# Patient Record
Sex: MALE | Race: WHITE | NOT HISPANIC OR LATINO | Employment: FULL TIME | URBAN - METROPOLITAN AREA
[De-identification: names, ages, dates, MRNs, and addresses within clinical notes are randomized per-mention and may not be internally consistent; named-entity substitution may affect disease eponyms.]

---

## 2017-06-13 ENCOUNTER — HOSPITAL ENCOUNTER (EMERGENCY)
Facility: HOSPITAL | Age: 32
Discharge: HOME | End: 2017-06-13
Attending: EMERGENCY MEDICINE | Admitting: EMERGENCY MEDICINE
Payer: COMMERCIAL

## 2017-06-13 ENCOUNTER — APPOINTMENT (EMERGENCY)
Dept: RADIOLOGY | Facility: HOSPITAL | Age: 32
End: 2017-06-13
Payer: COMMERCIAL

## 2017-06-13 VITALS
BODY MASS INDEX: 26.6 KG/M2 | OXYGEN SATURATION: 97 % | RESPIRATION RATE: 16 BRPM | DIASTOLIC BLOOD PRESSURE: 62 MMHG | HEIGHT: 71 IN | WEIGHT: 190 LBS | HEART RATE: 84 BPM | SYSTOLIC BLOOD PRESSURE: 134 MMHG | TEMPERATURE: 97.6 F

## 2017-06-13 DIAGNOSIS — S62.639A DISTAL PHALANX OR PHALANGES, CLOSED FRACTURE: Primary | ICD-10-CM

## 2017-06-13 PROCEDURE — 73140 X-RAY EXAM OF FINGER(S): CPT

## 2017-06-13 PROCEDURE — 99283 EMERGENCY DEPT VISIT LOW MDM: CPT

## 2017-06-13 RX ORDER — OXYCODONE HYDROCHLORIDE AND ACETAMINOPHEN 5; 325 MG/1; MG/1
1 TABLET ORAL EVERY 6 HOURS PRN
Qty: 12 TABLET | Refills: 0 | Status: SHIPPED | OUTPATIENT
Start: 2017-06-13 | End: 2017-09-12

## 2017-06-13 RX ORDER — OXYCODONE HYDROCHLORIDE AND ACETAMINOPHEN 5; 325 MG/1; MG/1
1 TABLET ORAL ONCE
Status: COMPLETED | OUTPATIENT
Start: 2017-06-13 | End: 2017-06-13

## 2017-06-13 RX ADMIN — OXYCODONE HYDROCHLORIDE AND ACETAMINOPHEN 1 TABLET: 5; 325 TABLET ORAL at 19:54

## 2017-09-12 ENCOUNTER — HOSPITAL ENCOUNTER (EMERGENCY)
Facility: HOSPITAL | Age: 32
Discharge: HOME | End: 2017-09-12
Attending: EMERGENCY MEDICINE | Admitting: EMERGENCY MEDICINE
Payer: COMMERCIAL

## 2017-09-12 VITALS
BODY MASS INDEX: 27.71 KG/M2 | WEIGHT: 204.59 LBS | TEMPERATURE: 97.7 F | SYSTOLIC BLOOD PRESSURE: 120 MMHG | RESPIRATION RATE: 18 BRPM | HEIGHT: 72 IN | HEART RATE: 71 BPM | DIASTOLIC BLOOD PRESSURE: 80 MMHG | OXYGEN SATURATION: 98 %

## 2017-09-12 DIAGNOSIS — K02.9 DENTAL CARIES: Primary | ICD-10-CM

## 2017-09-12 PROCEDURE — 99282 EMERGENCY DEPT VISIT SF MDM: CPT

## 2017-09-12 RX ORDER — PENICILLIN V POTASSIUM 250 MG/1
500 TABLET ORAL ONCE
Status: COMPLETED | OUTPATIENT
Start: 2017-09-12 | End: 2017-09-12

## 2017-09-12 RX ORDER — OXYCODONE HYDROCHLORIDE AND ACETAMINOPHEN 5; 325 MG/1; MG/1
1 TABLET ORAL EVERY 6 HOURS PRN
Qty: 6 TABLET | Refills: 0 | Status: SHIPPED | OUTPATIENT
Start: 2017-09-12 | End: 2017-09-15

## 2017-09-12 RX ORDER — OXYCODONE HYDROCHLORIDE AND ACETAMINOPHEN 5; 325 MG/1; MG/1
1 TABLET ORAL ONCE
Status: COMPLETED | OUTPATIENT
Start: 2017-09-12 | End: 2017-09-12

## 2017-09-12 RX ORDER — PENICILLIN V POTASSIUM 500 MG/1
500 TABLET ORAL 3 TIMES DAILY
Qty: 30 TABLET | Refills: 0 | Status: SHIPPED | OUTPATIENT
Start: 2017-09-12 | End: 2017-09-22

## 2017-09-12 RX ADMIN — OXYCODONE HYDROCHLORIDE AND ACETAMINOPHEN 1 TABLET: 5; 325 TABLET ORAL at 22:32

## 2017-09-12 RX ADMIN — PENICILLIN V POTASIUM 500 MG: 250 TABLET ORAL at 22:32

## 2017-09-13 NOTE — ED PROVIDER NOTES
History  Chief Complaint   Patient presents with    Dental Pain     ongoing dental issue with left upper molar  1 week ago started with pain progressively worsening     33 y o  male  with no significant past medical history presents to ED with chief complaint of dental pain  Onset reported as 1 week ago  Location of symptoms is reported as left upper molar  Quality is reported as throbbing pain  Severity is reported as severe  Associated symptoms: denies headaches, denies fevers, denies dysphagia, denies dysphonia, denies drooling, denies facial swelling, denies rash  Modifiers:  Chewing and eating exacerbates pain  Context: medical summary: review of past visit history via EPIC demonstrates patient was last seen in ED on 6/13/2017 for evaluation of right hand middle finger crush injury  History provided by:  Patient   used: No    Dental Pain   Associated symptoms: no congestion, no drooling, no facial swelling, no fever, no headaches, no neck pain and no oral lesions        None       History reviewed  No pertinent past medical history  History reviewed  No pertinent surgical history  History reviewed  No pertinent family history  I have reviewed and agree with the history as documented  Social History   Substance Use Topics    Smoking status: Current Every Day Smoker     Packs/day: 1 00    Smokeless tobacco: Former User    Alcohol use Yes      Comment: socially        Review of Systems   Constitutional: Negative for activity change, appetite change, chills, diaphoresis, fatigue and fever  HENT: Positive for dental problem  Negative for congestion, drooling, ear discharge, ear pain, facial swelling, hearing loss, mouth sores, nosebleeds, postnasal drip, rhinorrhea, sinus pain, sinus pressure, sneezing, sore throat, tinnitus, trouble swallowing and voice change  Eyes: Negative for photophobia, pain, discharge, redness, itching and visual disturbance  Respiratory: Negative for cough, chest tightness, shortness of breath and wheezing  Cardiovascular: Negative for chest pain, palpitations and leg swelling  Gastrointestinal: Negative for abdominal pain, constipation, diarrhea, nausea and vomiting  Endocrine: Negative for cold intolerance, heat intolerance, polydipsia, polyphagia and polyuria  Genitourinary: Negative for decreased urine volume, difficulty urinating, dysuria, flank pain, frequency, hematuria and urgency  Musculoskeletal: Negative for arthralgias, back pain, gait problem, joint swelling, myalgias, neck pain and neck stiffness  Skin: Negative for color change, pallor, rash and wound  Allergic/Immunologic: Negative for environmental allergies, food allergies and immunocompromised state  Neurological: Negative for dizziness, tremors, seizures, syncope, facial asymmetry, speech difficulty, weakness, light-headedness, numbness and headaches  Hematological: Negative for adenopathy  Does not bruise/bleed easily  Psychiatric/Behavioral: Negative for agitation, confusion, decreased concentration and hallucinations  The patient is not nervous/anxious  All other systems reviewed and are negative  Physical Exam  ED Triage Vitals [09/12/17 2147]   Temperature Pulse Respirations Blood Pressure SpO2   97 7 °F (36 5 °C) 71 18 120/80 98 %      Temp Source Heart Rate Source Patient Position - Orthostatic VS BP Location FiO2 (%)   Oral Monitor Sitting Right arm --      Pain Score       9           Physical Exam   Constitutional: He is oriented to person, place, and time  He appears well-developed and well-nourished  No distress  /80   Pulse 71   Temp 97 7 °F (36 5 °C) (Oral)   Resp 18   Ht 6' (1 829 m)   Wt 92 8 kg (204 lb 9 4 oz)   SpO2 98%   BMI 27 75 kg/m²   Interpretation normal, no intervention  HENT:   Head: Normocephalic and atraumatic     Right Ear: External ear normal    Left Ear: External ear normal    Nose: Nose normal    Mouth/Throat: Oropharynx is clear and moist  No oropharyngeal exudate  There is enamel erosion with gingival erythema present to left upper 1st molar  No definable dental abscess  No sublingual or submandibular fullness or swelling  No trismus  Eyes: Conjunctivae and EOM are normal  Pupils are equal, round, and reactive to light  Right eye exhibits no discharge  Left eye exhibits no discharge  No scleral icterus  Neck: Normal range of motion  Neck supple  No JVD present  No tracheal deviation present  Cardiovascular: Normal rate, regular rhythm and intact distal pulses  Pulmonary/Chest: Effort normal and breath sounds normal  No respiratory distress  He has no wheezes  He has no rales  He exhibits no tenderness  Abdominal: Soft  Bowel sounds are normal  He exhibits no distension and no mass  There is no tenderness  There is no guarding  Musculoskeletal: Normal range of motion  He exhibits no edema, tenderness or deformity  Lymphadenopathy:     He has no cervical adenopathy  Neurological: He is alert and oriented to person, place, and time  No cranial nerve deficit  He exhibits normal muscle tone  Coordination normal    Skin: Skin is warm  Capillary refill takes less than 2 seconds  No rash noted  He is not diaphoretic  No erythema  No pallor  Psychiatric: He has a normal mood and affect  His behavior is normal  Judgment and thought content normal    Nursing note and vitals reviewed        ED Medications  Medications   oxyCODONE-acetaminophen (PERCOCET) 5-325 mg per tablet 1 tablet (1 tablet Oral Given 9/12/17 2232)   penicillin V potassium (VEETID) tablet 500 mg (500 mg Oral Given 9/12/17 2232)       Diagnostic Studies  Labs Reviewed - No data to display    No orders to display       Procedures  Procedures      Phone Contacts  ED Phone Contact    ED Course  ED Course                                MDM  Number of Diagnoses or Management Options  Dental caries: new and does not require workup  Diagnosis management comments: Differential diagnosis includes but is not limited to dental caries, dental infection, dental abscess, facial abscess, facial cellulitis, considered but doubt Panda's angina  Amount and/or Complexity of Data Reviewed  Review and summarize past medical records: yes    Risk of Complications, Morbidity, and/or Mortality  General comments: Discussed with patient treatment to include antibiotics to cover normal oral marie infections, add analgesic pain medication  Follow up with primary care physician and dentist in 1-2 days for further evaluation and treatment  Reviewed reasons to return to the emergency department  Standard narcotic precautions were given  Patient Progress  Patient progress: stable    CritCare Time    Disposition  Final diagnoses:   Dental caries     ED Disposition     ED Disposition Condition Comment    Discharge  Naima Challenger discharge to home/self care  Condition at discharge: Stable        Follow-up Information     Follow up With Specialties Details Why Kristi [address]  Call in 1 day for further evaluation of symptoms [phone], [place]  Call in 1 day for further evaluation of symptoms [address] [address] [facility]  [phone]          Discharge Medication List as of 9/12/2017 10:22 PM      START taking these medications    Details   oxyCODONE-acetaminophen (PERCOCET) 5-325 mg per tablet Take 1 tablet by mouth every 6 (six) hours as needed (dental pain/initial rx) for up to 3 days Label no driving no etoh  Initial rx  Dx: Max Daily Amount: 4 tablets, Starting Tue 9/12/2017, Until Fri 9/15/2017, Print      penicillin V potassium (VEETID) 500 mg tablet Take 1 tablet by mouth 3 (three) times a day for 10 days, Starting Tue 9/12/2017, Until Fri 9/22/2017, Print           No discharge procedures on file      ED Provider  Electronically Signed by       Porter Vegas Mike Ham PA-C  09/13/17 2412

## 2017-09-13 NOTE — DISCHARGE INSTRUCTIONS
Dental Caries   WHAT YOU NEED TO KNOW:   Dental caries are also called cavities  Cavities are caused by bacteria  When the bacteria in tooth plaque (sticky film) mix with certain types of carbohydrate, this creates acid  The acid breaks down areas of enamel, which covers the outside of a tooth  This creates a small hole in the tooth called a cavity  DISCHARGE INSTRUCTIONS:   Return to the emergency department if:   · Your face, jaw, cheek, eye, or neck begin to swell  Contact your dentist if:   · You have a fever  · Your tooth pain gets worse  · You have questions or concerns about your condition or care  Follow up with your dentist as directed:  Write down your questions so you remember to ask them during your visits  Prevent dental caries:   · Brush your teeth at least 2 times a day with fluoride toothpaste  · Use dental floss to clean between your teeth at least once a day  · Rinse your mouth with water or mouthwash after meals and snacks  · Chew sugarless gum after meals and snacks  · See your dentist regularly for dental cleanings and oral exams  © 2017 [address] is for End User's use only and may not be sold, redistributed or otherwise used for commercial purposes  All illustrations and images included in CareNotes® are the copyrighted property of A D A M [employer]  or Vaughn Acevedo  The above information is an  only  It is not intended as medical advice for individual conditions or treatments  Talk to your doctor, nurse or pharmacist before following any medical regimen to see if it is safe and effective for you

## 2017-11-09 ENCOUNTER — APPOINTMENT (EMERGENCY)
Dept: RADIOLOGY | Facility: HOSPITAL | Age: 32
End: 2017-11-09
Payer: COMMERCIAL

## 2017-11-09 ENCOUNTER — HOSPITAL ENCOUNTER (EMERGENCY)
Facility: HOSPITAL | Age: 32
Discharge: HOME | End: 2017-11-09
Attending: EMERGENCY MEDICINE | Admitting: EMERGENCY MEDICINE
Payer: COMMERCIAL

## 2017-11-09 VITALS
WEIGHT: 215 LBS | BODY MASS INDEX: 30.1 KG/M2 | DIASTOLIC BLOOD PRESSURE: 57 MMHG | SYSTOLIC BLOOD PRESSURE: 108 MMHG | RESPIRATION RATE: 18 BRPM | HEIGHT: 71 IN | HEART RATE: 70 BPM | TEMPERATURE: 97.3 F | OXYGEN SATURATION: 100 %

## 2017-11-09 DIAGNOSIS — M62.830 SPASM OF LUMBAR PARASPINOUS MUSCLE: Primary | ICD-10-CM

## 2017-11-09 LAB
BACTERIA UR QL AUTO: ABNORMAL /HPF
BILIRUB UR QL STRIP: ABNORMAL
CLARITY UR: CLEAR
COLOR UR: YELLOW
GLUCOSE UR STRIP-MCNC: NEGATIVE MG/DL
HGB UR QL STRIP.AUTO: NEGATIVE
KETONES UR STRIP-MCNC: NEGATIVE MG/DL
LEUKOCYTE ESTERASE UR QL STRIP: NEGATIVE
MUCOUS THREADS UR QL AUTO: ABNORMAL
NITRITE UR QL STRIP: NEGATIVE
NON-SQ EPI CELLS URNS QL MICRO: ABNORMAL /HPF
PH UR STRIP.AUTO: 6 [PH] (ref 4.5–8)
PROT UR STRIP-MCNC: ABNORMAL MG/DL
RBC #/AREA URNS AUTO: ABNORMAL /HPF
SP GR UR STRIP.AUTO: >=1.03 (ref 1–1.03)
UROBILINOGEN UR QL STRIP.AUTO: 0.2 E.U./DL
WBC #/AREA URNS AUTO: ABNORMAL /HPF

## 2017-11-09 PROCEDURE — 81001 URINALYSIS AUTO W/SCOPE: CPT | Performed by: EMERGENCY MEDICINE

## 2017-11-09 PROCEDURE — 99283 EMERGENCY DEPT VISIT LOW MDM: CPT

## 2017-11-09 PROCEDURE — 72100 X-RAY EXAM L-S SPINE 2/3 VWS: CPT

## 2017-11-09 RX ORDER — DIAZEPAM 5 MG/1
5 TABLET ORAL 2 TIMES DAILY
Qty: 20 TABLET | Refills: 0 | Status: SHIPPED | OUTPATIENT
Start: 2017-11-09 | End: 2017-11-19

## 2017-11-09 RX ORDER — IBUPROFEN 600 MG/1
600 TABLET ORAL EVERY 6 HOURS PRN
Qty: 30 TABLET | Refills: 0 | Status: SHIPPED | OUTPATIENT
Start: 2017-11-09 | End: 2017-11-12

## 2017-11-10 NOTE — ED PROVIDER NOTES
History  Chief Complaint   Patient presents with    Back Pain     pt comes to ed c/o lower back pain, mostly left side  Pt states pain is intermittent and sharp  Pt tried otc icyhot with no relief  Pt dneies any dysuria, fever or chills at home      36-year-old male patient presents emergency department for evaluation of left-sided back pain which has been progressively worsening over the last several days  The patient states this is new, worsening, not really associated with any known injury  The patient does have palpable muscle spasm in the left lower lumbar spine region but there is also a palpable area of bone which I cannot explain  Because of this, x-ray will be done, will be reviewed interpreted by me primarily  Because of the area the patient's pain is well the patient will have a urinalysis done to assess for any bloody urine  If there is blood in the urine then the patient will have stone study done if none is overtly seen on x-ray  History provided by:  Patient   used: No    Back Pain   Location:  Lumbar spine  Quality:  Aching  Radiates to:  Does not radiate  Pain severity:  Moderate  Pain is:  Same all the time  Timing:  Constant  Progression:  Worsening  Chronicity:  New  Context: not emotional stress, not falling, not lifting heavy objects, not occupational injury, not pedestrian accident, not recent illness and not recent injury    Relieved by:  Nothing  Worsened by:  Nothing  Ineffective treatments:  None tried  Associated symptoms: no bladder incontinence, no bowel incontinence, no headaches, no numbness, no pelvic pain and no perianal numbness        None       History reviewed  No pertinent past medical history  History reviewed  No pertinent surgical history  History reviewed  No pertinent family history  I have reviewed and agree with the history as documented      Social History   Substance Use Topics    Smoking status: Current Every Day Smoker Packs/day: 1 00    Smokeless tobacco: Former User    Alcohol use Yes      Comment: socially        Review of Systems   Gastrointestinal: Negative for bowel incontinence  Genitourinary: Negative for bladder incontinence and pelvic pain  Musculoskeletal: Positive for back pain  Neurological: Negative for numbness and headaches  All other systems reviewed and are negative  Physical Exam  ED Triage Vitals   Temperature Pulse Respirations Blood Pressure SpO2   11/09/17 1823 11/09/17 1823 11/09/17 1823 11/09/17 1824 11/09/17 1823   (!) 97 3 °F (36 3 °C) 66 18 134/78 98 %      Temp Source Heart Rate Source Patient Position - Orthostatic VS BP Location FiO2 (%)   11/09/17 1823 11/09/17 1823 11/09/17 1824 11/09/17 1824 --   Temporal Monitor Sitting Right arm       Pain Score       --                  Orthostatic Vital Signs  Vitals:    11/09/17 1830 11/09/17 1845 11/09/17 2020 11/09/17 2112   BP:   111/60 108/57   Pulse: 70 64 57 70   Patient Position - Orthostatic VS:   Lying Sitting       Physical Exam   Constitutional: He is oriented to person, place, and time  He appears well-developed and well-nourished  No distress  HENT:   Head: Normocephalic and atraumatic  Right Ear: External ear normal    Left Ear: External ear normal    Eyes: Conjunctivae and EOM are normal  Right eye exhibits no discharge  Left eye exhibits no discharge  No scleral icterus  Neck: Normal range of motion  Neck supple  No JVD present  No tracheal deviation present  No thyromegaly present  Cardiovascular: Normal rate and regular rhythm  Pulmonary/Chest: Effort normal and breath sounds normal  No stridor  No respiratory distress  He has no wheezes  He has no rales  Abdominal: Soft  Bowel sounds are normal  He exhibits no distension  There is no tenderness  Musculoskeletal: Normal range of motion  He exhibits no edema, tenderness or deformity  Neurological: He is alert and oriented to person, place, and time   No cranial nerve deficit  Coordination normal    Skin: Skin is warm and dry  He is not diaphoretic  Psychiatric: He has a normal mood and affect  His behavior is normal    Nursing note and vitals reviewed  ED Medications  Medications - No data to display    Diagnostic Studies  Results Reviewed     Procedure Component Value Units Date/Time    Urine Microscopic [[id number]]  (Abnormal) Collected:  11/09/17 1950    Lab Status:  Final result Specimen:  Urine from Urine, Clean Catch Updated:  11/09/17 2010     RBC, UA None Seen /hpf      WBC, UA 1-2 (A) /hpf      Epithelial Cells Occasional /hpf      Bacteria, UA Occasional /hpf      MUCOUS THREADS Innumerable    UA w Reflex to Microscopic w Reflex to Culture [[id number]]  (Abnormal) Collected:  11/09/17 1950    Lab Status:  Final result Specimen:  Urine from Urine, Clean Catch Updated:  11/09/17 1959     Color, UA Yellow     Clarity, UA Clear     Specific Gravity, UA >=1 030     pH, UA 6 0     Leukocytes, UA Negative     Nitrite, UA Negative     Protein, UA 30 (1+) (A) mg/dl      Glucose, UA Negative mg/dl      Ketones, UA Negative mg/dl      Urobilinogen, UA 0 2 E U /dl      Bilirubin, UA Small (A)     Blood, UA Negative                 XR spine lumbar 2 or 3 views injury   ED Interpretation by Hilario Najjar, DO (11/09 2103)   Straightening of the lumbar lordosis      Final Result by Kaity Mcnamara DO (11/10 2866)      Normal examination           Workstation performed: [id number]                    Procedures  Procedures       Phone Contacts  ED Phone Contact    ED Course  ED Course                                MDM  Number of Diagnoses or Management Options  Spasm of lumbar paraspinous muscle: new and requires workup     Amount and/or Complexity of Data Reviewed  Clinical lab tests: reviewed and ordered  Tests in the radiology section of CPT®: ordered and reviewed  Decide to obtain previous medical records or to obtain history from someone other than the patient: yes  Review and summarize past medical records: yes    Patient Progress  Patient progress: stable    CritCare Time    Disposition  Final diagnoses:   Spasm of lumbar paraspinous muscle     Time reflects when diagnosis was documented in both MDM as applicable and the Disposition within this note     Time User Action Codes Description Comment    11/9/2017  9:03 PM Barbara Garcia Add [[id number]] Spasm of lumbar paraspinous muscle       ED Disposition     ED Disposition Condition Comment    Discharge  Tom Pamela discharge to home/self care  Condition at discharge: Stable        Follow-up Information     Follow up With Specialties Details Why Contact Info    Jess Layne   As needed [phone]          Discharge Medication List as of 11/9/2017  9:04 PM      START taking these medications    Details   diazepam (VALIUM) 5 mg tablet Take 1 tablet by mouth 2 (two) times a day for 10 days, Starting Thu 11/9/2017, Until Sun 11/19/2017, Print      ibuprofen (MOTRIN) 600 mg tablet Take 1 tablet by mouth every 6 (six) hours as needed for mild pain for up to 3 days, Starting Thu 11/9/2017, Until [place] 11/12/2017, Print           No discharge procedures on file      ED Provider  Electronically Signed by           Cher Wallace DO  11/10/17 3148

## 2017-11-10 NOTE — DISCHARGE INSTRUCTIONS
Muscle Spasm   WHAT YOU NEED TO KNOW:   A muscle spasm is a sudden contraction of any muscle or group of muscles  A muscle cramp is a painful muscle spasm  Muscle cramps commonly occur after intense exercise or during pregnancy  They may also be caused by certain medications, dehydration, low calcium or magnesium levels, or another medical condition  DISCHARGE INSTRUCTIONS:   Medicines: You may need the following:  · NSAIDs  help decrease swelling and pain or fever  This medicine is available with or without a doctor's order  NSAIDs can cause stomach bleeding or kidney problems in certain people  If you take blood thinner medicine, always ask your healthcare provider if NSAIDs are safe for you  Always read the medicine label and follow directions  · Take your medicine as directed  Contact your healthcare provider if you think your medicine is not helping or if you have side effects  Tell him of her if you are allergic to any medicine  Keep a list of the medicines, vitamins, and herbs you take  Include the amounts, and when and why you take them  Bring the list or the pill bottles to follow-up visits  Carry your medicine list with you in case of an emergency  Follow up with your healthcare provider as directed: You may need other tests or treatment  You may also be referred to a physical therapist or other specialist  Write down your questions so you remember to ask them during your visits  Self-care:   · Stretch  your muscle to help relieve the cramp  It may be helpful to keep your muscle in the stretched position until the cramp is gone  · Apply heat  to help decrease pain and muscle spasms  Apply heat on the area for 20 to 30 minutes every 2 hours for as many days as directed  · Apply ice  to help decrease swelling and pain  Ice may also help prevent tissue damage  Use an ice pack, or put crushed ice in a plastic bag   Cover it with a towel and place it on your muscle for 15 to 20 minutes every hour or as directed  · Drink more liquids  to help prevent muscle cramps caused by dehydration  Sports drinks may help replace electrolytes you lose through sweat during exercise  Ask your healthcare provider how much liquid to drink each day and which liquids are best for you  · Eat healthy foods , such as fruits, vegetables, whole grains, low-fat dairy products, and lean proteins (meat, beans, and fish)  If you are pregnant, ask your healthcare provider about foods that are high in magnesium and sodium  They may help to relieve cramps during pregnancy  · Massage your muscle  to help relieve the cramp  · Take frequent deep breaths  until the cramp feels better  Lie down while you take the deep breaths so you do not get dizzy or lightheaded  Contact your healthcare provider if:   · You have signs of dehydration, such as a headache, dark yellow urine, dry eyes or mouth, or a fast heartbeat  · You have questions or concerns about your condition or care  Return to the emergency department if:   · You have warmth, swelling, or redness in the cramping muscle  · You have frequent or unrelieved muscle cramps in several different muscles  · You have muscle cramps with numbness, tingling, and burning in your hands and feet  © 2017 [address] Information is for End User's use only and may not be sold, redistributed or otherwise used for commercial purposes  All illustrations and images included in CareNotes® are the copyrighted property of [employer]  or Vaughn Acevedo  The above information is an  only  It is not intended as medical advice for individual conditions or treatments  Talk to your doctor, nurse or pharmacist before following any medical regimen to see if it is safe and effective for you

## 2018-02-12 ENCOUNTER — HOSPITAL ENCOUNTER (EMERGENCY)
Facility: HOSPITAL | Age: 33
Discharge: HOME | End: 2018-02-12
Attending: EMERGENCY MEDICINE | Admitting: EMERGENCY MEDICINE
Payer: COMMERCIAL

## 2018-02-12 VITALS
SYSTOLIC BLOOD PRESSURE: 125 MMHG | WEIGHT: 220 LBS | HEART RATE: 59 BPM | HEIGHT: 72 IN | BODY MASS INDEX: 29.8 KG/M2 | RESPIRATION RATE: 20 BRPM | TEMPERATURE: 97.8 F | DIASTOLIC BLOOD PRESSURE: 56 MMHG | OXYGEN SATURATION: 99 %

## 2018-02-12 DIAGNOSIS — H16.9 KERATITIS, BILATERAL: ICD-10-CM

## 2018-02-12 DIAGNOSIS — H53.143 PHOTOPHOBIA OF BOTH EYES: Primary | ICD-10-CM

## 2018-02-12 PROCEDURE — 99283 EMERGENCY DEPT VISIT LOW MDM: CPT

## 2018-02-12 RX ORDER — IBUPROFEN 600 MG/1
600 TABLET ORAL ONCE
Status: COMPLETED | OUTPATIENT
Start: 2018-02-12 | End: 2018-02-12

## 2018-02-12 RX ORDER — TOBRAMYCIN 3 MG/ML
1 SOLUTION/ DROPS OPHTHALMIC
Status: DISCONTINUED | OUTPATIENT
Start: 2018-02-12 | End: 2018-02-12 | Stop reason: HOSPADM

## 2018-02-12 RX ORDER — PROPARACAINE HYDROCHLORIDE 5 MG/ML
1 SOLUTION/ DROPS OPHTHALMIC ONCE
Status: COMPLETED | OUTPATIENT
Start: 2018-02-12 | End: 2018-02-12

## 2018-02-12 RX ADMIN — TOBRAMYCIN 1 DROP: 3 SOLUTION OPHTHALMIC at 20:48

## 2018-02-12 RX ADMIN — FLUORESCEIN SODIUM 2 STRIP: 0.6 STRIP OPHTHALMIC at 20:14

## 2018-02-12 RX ADMIN — PROPARACAINE HYDROCHLORIDE 1 DROP: 5 SOLUTION/ DROPS OPHTHALMIC at 20:14

## 2018-02-12 RX ADMIN — IBUPROFEN 600 MG: 600 TABLET ORAL at 20:15

## 2018-02-12 NOTE — ED NOTES
Visual acuity performed  Pt roomed  RN at bedside       Rene STEPHENS Dionysius  02/12/18 [address] 16  02/12/18 3529

## 2018-02-13 NOTE — DISCHARGE INSTRUCTIONS
Keratitis   WHAT YOU NEED TO KNOW:   What is keratitis? Keratitis is inflammation of the cornea  The cornea is the clear layer that covers the front of your eye  Keratitis usually affects one eye, but you can have it in both eyes  What causes keratitis? · Trauma, such as a fingernail scratch or foreign body    · Wearing contact lenses for longer than recommended    · Bacteria, viruses, fungi, or parasites    · Inflammatory diseases, such as rheumatoid arthritis or Wegener granulomatosis  What increases my risk for keratitis? · Recent eye surgery    · Poor nutrition    · Infection near or around the eye, such as pink eye    · Weak immune system, or medical conditions such as diabetes  What are the signs and symptoms of keratitis? · Red eye    · Eye pain    · Feeling that something is in your eye    · Watery eye    · Blurred vision    · Sensitivity to light    · Swelling of the eyelid  How is keratitis diagnosed? Your healthcare provider will ask about your symptoms and when they started  You may also need any of the following:  · Visual acuity test:  Your healthcare provider will check your eye movement and ask you to read an eye chart  This chart helps your healthcare provider check how well you see at different distances  · Slit-lamp exam:  During this exam, your healthcare provider shines a bright light in your eye to check for inflammation  You may need eyedrops to dilate your pupils  This gives your healthcare provider a better view of the inside of your eye  · Tonometry: This test measures your eye pressure  Your eyes are numbed with eyedrops and your healthcare provider touches your eyes with an instrument  Or, a puff of air is blown into your eyes and pressure is measured with a light  · Biopsy: Your healthcare provider may send a sample of your cornea to a lab for tests  This will help him plan the best treatment for you  How is keratitis treated? · Infection medicine:   You may be given medicine to treat an infection caused by bacteria, a fungus, or a virus  These medicines may be given as eyedrops or as pills  · Steroids: This medicine decreases inflammation  It is given as eyedrops  · Corneal transplant: You may need surgery if medicines do not work or your cornea is badly damaged  A cornea from a donor is put into your eye to replace your damaged cornea  What are the risks of keratitis? You may have long-term inflammation of your cornea  Your symptoms may return  You may develop sores on your cornea  Without treatment, your cornea may scar and tear  You may have a sudden decrease in your vision or lose your vision completely  How can I help prevent keratitis? · Use contact lenses correctly  Know how long to use them and how to clean and store them properly  · Wash your hands often  Use soap and water  Wash your hands after you use the bathroom, change a child's diapers, or sneeze  Wash your hands before you prepare or eat food  · Wear safety equipment when you work, garden, or play sports  · Do not rub your eyes while you work with wood or metal   When should I contact my healthcare provider? · You have a fever  · Your symptoms get worse, even after treatment  · You have pus draining from your eye  · You have questions or concerns about your condition or care  When should I seek immediate care or call 911? · You have severe eye pain  · You have a sudden change in your vision  · You suddenly lose your vision  CARE AGREEMENT:   You have the right to help plan your care  Learn about your health condition and how it may be treated  Discuss treatment options with your caregivers to decide what care you want to receive  You always have the right to refuse treatment  The above information is an  only  It is not intended as medical advice for individual conditions or treatments   Talk to your doctor, nurse or pharmacist before following any medical regimen to see if it is safe and effective for you  © 2017 [facility] Information is for End User's use only and may not be sold, redistributed or otherwise used for commercial purposes  All illustrations and images included in CareNotes® are the copyrighted property of A D A M [employer] Inc  or Vaughn Acevedo

## 2018-02-13 NOTE — ED PROVIDER NOTES
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  He has never had photokeratitis in the past         HPI      Past Medical and Surgical History    History reviewed  No pertinent past medical history  History reviewed  No pertinent surgical history  History reviewed  No pertinent family history  Social History   Substance Use Topics    Smoking status: Current Every Day Smoker     Packs/day: 1 00    Smokeless tobacco: Former User    Alcohol use 3 6 oz/week     6 Cans of beer per week      Comment: socially           Allergies    No Known Allergies    Home Medications    Prior to Admission medications    Medication Sig Start Date End Date Taking? Authorizing Provider   diazepam (VALIUM) 5 mg tablet Take 1 tablet by mouth 2 (two) times a day for 10 days 11/9/17 11/19/17  [facility], DO   ibuprofen (MOTRIN) 600 mg tablet Take 1 tablet by mouth every 6 (six) hours as needed for mild pain for up to 3 days 11/9/17 11/12/17  Shira Border, DO           Review of Systems    Review of Systems        All other systems reviewed and negative      Physical Exam      ED Triage Vitals [02/12/18 1730]   Temperature Pulse Respirations Blood Pressure SpO2   97 8 °F (36 6 °C) 59 19 127/76 99 %      Temp Source Heart Rate Source Patient Position - Orthostatic VS BP Location FiO2 (%)   Oral Monitor Sitting Left arm --      Pain Score       7               Physical Exam Constitutional: He is oriented to person, place, and time  He appears well-developed and well-nourished  No distress  HENT:   Head: Normocephalic and atraumatic  Nose: Nose normal    Mouth/Throat: Oropharynx is clear and moist    Eyes: EOM and lids are normal  Pupils are equal, round, and reactive to light  Right eye exhibits no chemosis, no discharge, no exudate and no hordeolum  No foreign body present in the right eye  Left eye exhibits no chemosis, no discharge, no exudate and no hordeolum  No foreign body present in the left eye  Right conjunctiva is injected  Left conjunctiva is injected  No scleral icterus  Slit lamp exam:       The right eye shows corneal abrasion and fluorescein uptake  The left eye shows corneal abrasion and fluorescein uptake  Neck: Normal range of motion  Neck supple  Cardiovascular: Normal rate, regular rhythm and normal heart sounds  Exam reveals no gallop and no friction rub  No murmur heard  Pulmonary/Chest: Effort normal and breath sounds normal  No respiratory distress  He has no wheezes  He has no rales  Abdominal: Soft  Bowel sounds are normal  There is no tenderness  There is no rebound and no guarding  Musculoskeletal: Normal range of motion  Neurological: He is alert and oriented to person, place, and time  Skin: Skin is warm and dry  He is not diaphoretic  Psychiatric: He has a normal mood and affect  His behavior is normal    Nursing note and vitals reviewed  Assessment and Plan    Zenia Lee is a 33 y o  male who presents with eye pain and tearing  Physical examination remarkable for injection and fluorocine uptake  Differential diagnosis (not completely inclusive) includes keratitis  Plan will be to perform diagnostic testing and treat symptomatically  MDM    Diagnostic Results        Labs:         All labs reviewed and utilized in the medical decision making process    Radiology:    No orders to display       All radiology studies independently viewed by me and interpreted by the radiologist     Procedure    Procedures    CritCare Time      ED Course of Care and Re-Assessments    Patient will follow up as an outpatient  Medications   tobramycin (TOBREX) 0 3 % ophthalmic solution 1 drop (1 drop Both Eyes Given 2/12/18 2048)   proparacaine (ALCAINE) 0 5 % ophthalmic solution 1 drop (1 drop Both Eyes Given 2/12/18 2014)   fluorescein sodium sterile ophthalmic strip 2 strip (2 strips Both Eyes Given 2/12/18 2014)   ibuprofen (MOTRIN) tablet 600 mg (600 mg Oral Given 2/12/18 2015)     He was instructed not to wear his lenses until released by specialist      FINAL IMPRESSION    Final diagnoses:   Photophobia of both eyes   Keratitis, bilateral         DISPOSITION/PLAN      Time reflects when diagnosis was documented in both MDM as applicable and the Disposition within this note     Time User Action Codes Description Comment    2/12/2018  8:28 PM Tiarra Mola Add [H53 143] Photophobia of both eyes     2/12/2018  8:29 PM Tiarra Mola Add [H16 9] Keratitis, bilateral       ED Disposition     ED Disposition Condition Comment    Discharge  Mary Michel discharge to home/self care      Condition at discharge: Good        Follow-up Information     Follow up With Specialties Details Why [address] [facility]  Schedule an appointment as soon as possible for a visit  [address] [facility] [address] [address] [facility]  Schedule an appointment as soon as possible for a visit  [address]  [facility] Schedule an appointment as soon as possible for a visit in 1 day  [address]  [facility] [address] [place] [address] Avenue:    [place] [address]  [address] [facility]  Schedule an appointment as soon as possible for a visit      [address]  [address] [facility] [address]  [address]  [phone]  Schedule an appointment as soon as possible for a visit      [facility]  [address]  [facility] [address]  [phone]    Schedule an appointment as soon as possible for a visit in 1 day        DISCHARGE MEDICATIONS:    Discharge Medication List as of 2/12/2018  8:30 PM      CONTINUE these medications which have NOT CHANGED    Details   diazepam (VALIUM) 5 mg tablet Take 1 tablet by mouth 2 (two) times a day for 10 days, Starting Thu 11/9/2017, Until Sun 11/19/2017, Print      ibuprofen (MOTRIN) 600 mg tablet Take 1 tablet by mouth every 6 (six) hours as needed for mild pain for up to 3 days, Starting u 11/9/2017, Until [place] 11/12/2017, Print             No discharge procedures on file           Vincent Peters, [address], DO  02/12/18 4136